# Patient Record
Sex: FEMALE | Race: WHITE | NOT HISPANIC OR LATINO | Employment: STUDENT | URBAN - METROPOLITAN AREA
[De-identification: names, ages, dates, MRNs, and addresses within clinical notes are randomized per-mention and may not be internally consistent; named-entity substitution may affect disease eponyms.]

---
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
## 2019-01-18 RX ORDER — ESCITALOPRAM OXALATE 10 MG/1
TABLET ORAL
Qty: 30 TABLET | Refills: 4 | Status: SHIPPED | OUTPATIENT
Start: 2019-01-18 | End: 2019-03-18 | Stop reason: ALTCHOICE

## 2019-03-07 ENCOUNTER — TELEPHONE (OUTPATIENT)
Dept: INTERNAL MEDICINE | Facility: CLINIC | Age: 19
End: 2019-03-07

## 2019-03-07 NOTE — TELEPHONE ENCOUNTER
----- Message from Sol Mancilla sent at 3/7/2019  9:56 AM EST -----  PATIENT'S FRIEND RUBI CASTELAN CALLED AND STATES PATIENT NEEDS A REFERRAL TO A THERAPIST OR PSYCHIATRIST FOR DEPRESSION AND ANXIETY. HE STATES PATIENT DOESN'T WANT TO GET OUT OF BED, GO TO CLASS OR TALK ON THE PHONE. HE STATES SHE IS HAVING PANIC ATTACKS BUT SHE'S NOT SUICIDAL. HE STATES SHE HAS TROUBLE TALKING ON THE PHONE AND CAN BE REACHED AFTER 2 PM. SHE CAN BE REACHED -[phone]

## 2019-03-07 NOTE — TELEPHONE ENCOUNTER
ATTEMPTED TO CALL X 2 WITH NO ANSWER AND MESSAGE THAT [employer] BOX IS FULL AND CANNOT ACCEPT NEW MESSAGES.

## 2019-03-08 NOTE — TELEPHONE ENCOUNTER
Tried calling [employer] Phone #   VM full  .  Unable to leave a message.  LMOVM for Niurka - mother to return call  Office # given   She is listed on the PAO consent form

## 2019-03-08 NOTE — TELEPHONE ENCOUNTER
Patient came in and added Benny Alva to [facility]. He would like you to give him a call regarding this manner @ [phone]. Thank you.

## 2019-03-08 NOTE — TELEPHONE ENCOUNTER
Patients significant other Benny Jbrenée called and stated that he would like to be called. Informed him that she would have to come in and sign PAO.

## 2019-03-12 NOTE — TELEPHONE ENCOUNTER
Called and left a message for Benny to call me back.  He called back almost immediately.  He states Rosita has had a worsening of her depression and anxiety.  She has having increased frequency of panic attacks.  She is having a hard time waking up and getting to class.  He is speaking for her because she has a hard time speaking over the phone.  He states she is not suicidal and is not harming herself in any way.  She does not feel like her Lexapro is working anymore.  Currently they are on spring break and will return on 3/15/19.    I recommended Rosita come in for an appointment and he is agreeable.  Please put her on the schedule for 3/18/19 at 1:15 PM for depression.

## 2019-03-18 ENCOUNTER — OFFICE VISIT (OUTPATIENT)
Dept: INTERNAL MEDICINE | Facility: CLINIC | Age: 19
End: 2019-03-18

## 2019-03-18 VITALS
WEIGHT: 160 LBS | BODY MASS INDEX: 24.33 KG/M2 | DIASTOLIC BLOOD PRESSURE: 70 MMHG | TEMPERATURE: 98.5 F | RESPIRATION RATE: 20 BRPM | HEART RATE: 72 BPM | SYSTOLIC BLOOD PRESSURE: 132 MMHG

## 2019-03-18 DIAGNOSIS — F41.1 GENERALIZED ANXIETY DISORDER: ICD-10-CM

## 2019-03-18 DIAGNOSIS — F32.89 OTHER DEPRESSION: Primary | ICD-10-CM

## 2019-03-18 PROCEDURE — 99213 OFFICE O/P EST LOW 20 MIN: CPT | Performed by: INTERNAL MEDICINE

## 2019-03-18 RX ORDER — BUPROPION HYDROCHLORIDE 150 MG/1
150 TABLET ORAL DAILY
Qty: 30 TABLET | Refills: 5 | Status: SHIPPED | OUTPATIENT
Start: 2019-03-18

## 2019-03-18 NOTE — PROGRESS NOTES
"Subjective       Rosita Watkins is a 18 y.o. female.     Chief Complaint   Patient presents with   • Depression   • Anxiety       History obtained from the patient and her boyfriend.      History of Present Illness     Depression and Anxiety follow-up: The patient is here for follow-up of Depression and Anxiety, which are worsened.    Interval events: The patient was last seen on 10/16/18, after Lexapro 10 mg had been prescribed.  She states the medication worked initially, but then stopped working.  Her depression and anxiety seemed to worsen with the onset of Winter.  It has been especially worse since about mid January.  She states she stopped her Lexapro 1 1/2 months ago because it did not seem to be helping.  The patient is having trouble getting up out of bed and has missed classes.  This has caused academic issues.  She has a lot of stress at school due to this being her \"weed out semester\" for pre-Physical Therapy.  No other known stressors or triggers.  The only other medication she has tried in the past was Celexa, but states she only took it for about 1 week  Symptoms: Worsened anxiety, but stable depression.  She states she is tired all the time,  despite sleeping 12 hours at night and napping occasionally.  She has had panic attacks approximately once per day.  She has some restlessness.  She is having problems with memory and concentration.  She denies insomnia, anhedonia,  feelings of hopelessness, feelings of worthlessness, feelings of guilt, and suicidal ideation.    Medication: None.    Current Outpatient Medications on File Prior to Visit   Medication Sig Dispense Refill   • clindamycin (CLEOCIN T) 1 % lotion 1 application Daily.     • TRI-SPRINTEC 0.18/0.215/0.25 MG-35 MCG per tablet        No current facility-administered medications on file prior to visit.        Current outpatient and discharge medications have been reconciled for the patient.  Reviewed by: Joann Campblel MD        The " following portions of the patient's history were reviewed and updated as appropriate: allergies, current medications, past family history, past medical history, past social history, past surgical history and problem list.    Review of Systems   Constitutional: Positive for fatigue and unexpected weight change (weight up 15 pounds).   Neurological:        Reports memory issues.   Psychiatric/Behavioral: Positive for decreased concentration. Negative for confusion, self-injury, sleep disturbance and suicidal ideas. The patient is nervous/anxious.          Objective       Blood pressure 132/70, pulse 72, temperature 98.5 °F (36.9 °C), temperature source Temporal, resp. rate 20, weight 72.6 kg (160 lb).      Physical Exam   Constitutional: She appears well-developed and well-nourished.   Neurological: She is alert.   Psychiatric: She has a normal mood and affect.   Nursing note and vitals reviewed.    Counseling was given to patient for the following topics: appropriate exercise, risks and benefits of treament options (medication v therapy), side effects of medications (Lexapro), stress increase in the patient's life (as per HPI), symptoms of anxiety,  and symptoms of depression . Total time of the encounter was 15 minutes and 15 minutes was spent counseling.      Assessment / Plan:  Rosita was seen today for depression and anxiety.    Diagnoses and all orders for this visit:    Other depression  -     buPROPion XL (WELLBUTRIN XL) 150 MG 24 hr tablet; Take 1 tablet by mouth Daily.  -     Ambulatory Referral to Psychology    Generalized anxiety disorder  -     buPROPion XL (WELLBUTRIN XL) 150 MG 24 hr tablet; Take 1 tablet by mouth Daily.  -     Ambulatory Referral to Psychology          Return in about 4 weeks (around 4/15/2019) for Recheck- Depression and Anxiety.

## 2019-04-22 ENCOUNTER — OFFICE VISIT (OUTPATIENT)
Dept: INTERNAL MEDICINE | Facility: CLINIC | Age: 19
End: 2019-04-22

## 2019-04-22 ENCOUNTER — TELEPHONE (OUTPATIENT)
Dept: INTERNAL MEDICINE | Facility: CLINIC | Age: 19
End: 2019-04-22

## 2019-04-22 VITALS
TEMPERATURE: 98.1 F | SYSTOLIC BLOOD PRESSURE: 120 MMHG | WEIGHT: 155 LBS | HEART RATE: 80 BPM | DIASTOLIC BLOOD PRESSURE: 70 MMHG | RESPIRATION RATE: 20 BRPM | BODY MASS INDEX: 23.57 KG/M2

## 2019-04-22 DIAGNOSIS — F41.1 GENERALIZED ANXIETY DISORDER: ICD-10-CM

## 2019-04-22 DIAGNOSIS — F32.89 OTHER DEPRESSION: Primary | ICD-10-CM

## 2019-04-22 PROCEDURE — 99213 OFFICE O/P EST LOW 20 MIN: CPT | Performed by: INTERNAL MEDICINE

## 2019-04-22 RX ORDER — BUSPIRONE HYDROCHLORIDE 5 MG/1
5 TABLET ORAL 2 TIMES DAILY
Qty: 60 TABLET | Refills: 5 | Status: SHIPPED | OUTPATIENT
Start: 2019-04-22 | End: 2019-07-22 | Stop reason: SDUPTHER

## 2019-04-22 NOTE — PROGRESS NOTES
Subjective       Rosita Watkins is a 18 y.o. female.     Chief Complaint   Patient presents with   • Depression     1 month follow up         History obtained from the patient.      History of Present Illness     Depression and Anxiety Follow-up: The patient is here for follow-up of Depression and Anxiety, which are  overall worsened.    Interval events: The patient was unable to tolerate Lexapro.  She was seen on 3/18/19, and Wellbutrin XL was started.  She feels like it helped her depression and anxiety more initially than now.  She is having counseling at [facility].  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  Symptoms: Slightly improved depression, but anxiety essentially unchanged.  She has stable fatigue. She denies panic attacks, insomnia, anhedonia,  feelings of hopelessness, feelings of worthlessness, feelings of guilt, memory loss, concentration problems,  and suicidal ideation.    Medication:  Wellbutrin XL.  Side effects: Headache.    Current Outpatient Medications on File Prior to Visit   Medication Sig Dispense Refill   • buPROPion XL (WELLBUTRIN XL) 150 MG 24 hr tablet Take 1 tablet by mouth Daily. 30 tablet 5   • clindamycin (CLEOCIN T) 1 % lotion 1 application Daily.     • TRI-SPRINTEC 0.18/0.215/0.25 MG-35 MCG per tablet        No current facility-administered medications on file prior to visit.        Current outpatient and discharge medications have been reconciled for the patient.  Reviewed by: Joann Campbell MD        The following portions of the patient's history were reviewed and updated as appropriate: allergies, current medications, past family history, past medical history, past social history, past surgical history and problem list.    Review of Systems   Constitutional: Positive for fatigue and unexpected weight change (decreased 5 pounds).   Neurological: Positive for headaches.   Psychiatric/Behavioral: Negative for confusion, decreased  concentration, sleep disturbance and suicidal ideas. The patient is nervous/anxious.          Objective       Blood pressure 120/70, pulse 80, temperature 98.1 °F (36.7 °C), temperature source Temporal, resp. rate 20, weight 70.3 kg (155 lb).      Physical Exam   Neurological: She is alert.   Psychiatric: She has a normal mood and affect.   Nursing note and vitals reviewed.    Counseling was given to patient for the following topics: risks and benefits of treament options, side effects of medications, stress increase in the patient's life, symptoms of anxiety and symptoms of depression . Total time of the encounter was 15 minutes and 15 minutes was spent counseling.      Assessment / Plan:  Rosita was seen today for depression.    Diagnoses and all orders for this visit:    Other depression    Generalized anxiety disorder  -     busPIRone (BUSPAR) 5 MG tablet; Take 1 tablet by mouth 2 (Two) Times a Day.     Continue Wellbutrin XL.    The patient was instructed to call in 1 -2  weeks with an update on her anxiety and depression.      Return in about 3 months (around 7/22/2019) for Recheck Depression / Anxiety.

## 2019-07-22 ENCOUNTER — OFFICE VISIT (OUTPATIENT)
Dept: INTERNAL MEDICINE | Facility: CLINIC | Age: 19
End: 2019-07-22

## 2019-07-22 VITALS
TEMPERATURE: 98 F | SYSTOLIC BLOOD PRESSURE: 120 MMHG | HEART RATE: 64 BPM | DIASTOLIC BLOOD PRESSURE: 70 MMHG | WEIGHT: 155 LBS | RESPIRATION RATE: 20 BRPM | BODY MASS INDEX: 23.57 KG/M2

## 2019-07-22 DIAGNOSIS — F41.1 GENERALIZED ANXIETY DISORDER: Primary | ICD-10-CM

## 2019-07-22 DIAGNOSIS — F32.89 OTHER DEPRESSION: ICD-10-CM

## 2019-07-22 PROCEDURE — 99213 OFFICE O/P EST LOW 20 MIN: CPT | Performed by: INTERNAL MEDICINE

## 2019-07-22 RX ORDER — BUSPIRONE HYDROCHLORIDE 10 MG/1
10 TABLET ORAL 2 TIMES DAILY
Qty: 60 TABLET | Refills: 5 | Status: SHIPPED | OUTPATIENT
Start: 2019-07-22

## 2019-07-22 NOTE — PROGRESS NOTES
Subjective       Rosita Watkins is a 18 y.o. female.     Chief Complaint   Patient presents with   • Anxiety       History obtained from the patient.      History of Present Illness     Depression and Anxiety Follow-up: The patient is here for follow-up of Depression and Anxiety, which are stable.    Interval events: The patient was unable to tolerate Lexapro.  She was seen on 3/18/19, and Wellbutrin XL was started.    She was seen on 4/22/2019 for follow-up.  The current dose of Wellbutrin XL was continued.  BuSpar 5 mg twice daily was added.  She was instructed to call 1 to 2 weeks after that appointment, but did not.  She feels the BuSpar only helped a little.  She is still in counseling.  Symptoms: Slightly improved anxiety, and stable depression.  She has panic attacks approximately 3 times per week.  These are usually situational.  She has stable fatigue. She denies  insomnia, anhedonia,  feelings of hopelessness, feelings of worthlessness, feelings of guilt, memory loss, concentration problems,  and suicidal ideation.    Medication:  Wellbutrin XL and BuSpar.  Side effects: Headache (improving).       Current Outpatient Medications on File Prior to Visit   Medication Sig Dispense Refill   • buPROPion XL (WELLBUTRIN XL) 150 MG 24 hr tablet Take 1 tablet by mouth Daily. 30 tablet 5   • clindamycin (CLEOCIN T) 1 % lotion 1 application Daily.     • TRI-SPRINTEC 0.18/0.215/0.25 MG-35 MCG per tablet      • [DISCONTINUED] busPIRone (BUSPAR) 5 MG tablet Take 1 tablet by mouth 2 (Two) Times a Day. 60 tablet 5     No current facility-administered medications on file prior to visit.        Current outpatient and discharge medications have been reconciled for the patient.  Reviewed by: Joann Campbell MD        The following portions of the patient's history were reviewed and updated as appropriate: allergies, current medications, past family history, past medical history, past social history, past surgical history and  problem list.    Review of Systems   Constitutional: Positive for fatigue.   Neurological: Positive for headaches.        Denies memory loss.   Psychiatric/Behavioral: Negative for decreased concentration, self-injury, sleep disturbance and suicidal ideas. The patient is nervous/anxious.          Objective       Blood pressure 120/70, pulse 64, temperature 98 °F (36.7 °C), temperature source Temporal, resp. rate 20, weight 70.3 kg (155 lb).      Physical Exam   Constitutional: She appears well-developed and well-nourished.   Neurological: She is alert.   Psychiatric: She has a normal mood and affect.   Nursing note and vitals reviewed.    Counseling was given to patient for the following topics: appropriate exercise, importance of medication compliance, side effects of medications, stress increase in the patient's life, symptoms of anxiety and symptoms of depression . Total time of the encounter was 15 minutes and 15 minutes was spent counseling.      Assessment / Plan:  Rosita was seen today for anxiety.    Diagnoses and all orders for this visit:    Generalized anxiety disorder  -     busPIRone (BUSPAR) 10 MG tablet; Take 1 tablet by mouth 2 (Two) Times a Day (increased dose).   Continue Wellbutrin XL.   Continue counseling.    Other depression   Continue Wellbutrin XL.   Continue counseling.          Return in about 3 months (around 10/22/2019) for Annual physical, fasting.

## 2019-10-02 ENCOUNTER — OFFICE VISIT (OUTPATIENT)
Dept: ORTHOPEDIC SURGERY | Facility: CLINIC | Age: 19
End: 2019-10-02

## 2019-10-02 VITALS — HEIGHT: 68 IN | HEART RATE: 74 BPM | OXYGEN SATURATION: 99 % | WEIGHT: 149.91 LBS | BODY MASS INDEX: 22.72 KG/M2

## 2019-10-02 DIAGNOSIS — M25.561 RIGHT KNEE PAIN, UNSPECIFIED CHRONICITY: Primary | ICD-10-CM

## 2019-10-02 DIAGNOSIS — M76.51 PATELLAR TENDINITIS OF RIGHT KNEE: ICD-10-CM

## 2019-10-02 PROCEDURE — 99203 OFFICE O/P NEW LOW 30 MIN: CPT | Performed by: ORTHOPAEDIC SURGERY

## 2019-10-02 NOTE — PROGRESS NOTES
[facility] [place] [facility] Note    Subjective     Chief Complaint   Patient presents with   • Right Knee - Pain        HPI  Rosita Watkins is a 19 y.o. female.  She complains of right knee pain.  Insidious onset.  Started 2 months ago.  She is doing.  Pain is 5 out of 10.  Is aching and throbbing.    Past Medical History:   Diagnosis Date   • Asthma, exercise induced 10/29/2017    Dx childhood.   • Bipolar affective disorder (CMS/HCC)    • Iron deficiency anemia 10/29/2017    Dx approx 2013.   • Mononucleosis 11/08/2017      Past Surgical History:   Procedure Laterality Date   • WISDOM TOOTH EXTRACTION  12/2015      Family History   Problem Relation Age of Onset   • Asthma Mother    • Heart disease Father         congenital valve disorder   • No Known Problems Brother    • Heart attack Maternal Grandfather 76   • Heart disease Paternal Grandmother         ? etiology   • Stroke Paternal Grandfather 86   • Heart disease Paternal Grandfather         ? etiology   • No Known Problems Brother    • Heart disease Paternal Uncle         x 5 (congenital valve disorder)     Social History     Socioeconomic History   • Marital status: Single     Spouse name: Not on file   • Number of children: Not on file   • Years of education: Not on file   • Highest education level: Not on file   Tobacco Use   • Smoking status: Never Smoker   • Smokeless tobacco: Never Used   Substance and Sexual Activity   • Alcohol use: No   • Drug use: No      Current Outpatient Medications on File Prior to Visit   Medication Sig Dispense Refill   • buPROPion XL (WELLBUTRIN XL) 150 MG 24 hr tablet Take 1 tablet by mouth Daily. 30 tablet 5   • busPIRone (BUSPAR) 10 MG tablet Take 1 tablet by mouth 2 (Two) Times a Day. 60 tablet 5   • Chlorcyclizine-Pseudoephed (STAHIST AD) 25-60 MG tablet Take 1 tablet by mouth Every 8 (Eight) Hours As Needed (congestion, runny nose). 42 tablet 0   • clindamycin (CLEOCIN T) 1 % lotion 1 application Daily.     •  "fluticasone (FLONASE) 50 MCG/ACT nasal spray 2 sprays into the nostril(s) as directed by provider Daily. 1 bottle 0   • lamoTRIgine (LaMICtal) 100 MG tablet Take 100 mg by mouth Daily.     • TRI-SPRINTEC 0.18/0.215/0.25 MG-35 MCG per tablet        No current facility-administered medications on file prior to visit.       Allergies   Allergen Reactions   • Amoxicillin Hives and Swelling     Throat closes    • Nickel Itching and Rash        The following portions of the patient's history were reviewed and updated as appropriate: allergies, current medications, past family history, past medical history, past social history, past surgical history and problem list.    Review of Systems   Constitutional: Positive for activity change and fatigue.   HENT: Negative.    Eyes: Negative.    Respiratory: Negative.    Cardiovascular: Negative.    Gastrointestinal: Negative.    Endocrine: Negative.    Genitourinary: Negative.    Musculoskeletal: Positive for arthralgias and joint swelling.   Skin: Negative.    Allergic/Immunologic: Negative.    Neurological: Negative.    Hematological: Negative.    Psychiatric/Behavioral: Negative.         Objective      Physical Exam  Pulse 74   Ht 172.7 cm (67.99\")   Wt 68 kg (149 lb 14.6 oz)   LMP 09/02/2019   SpO2 99%   BMI 22.80 kg/m²     Body mass index is 22.8 kg/m².    GENERAL APPEARANCE: awake, alert & oriented x 3, in no acute distress and well developed, well nourished  PSYCH: normal mood and affect  LUNGS:  breathing nonlabored, no wheezing  EYES: sclera anicteric, pupils equal  CARDIOVASCULAR: palpable pulses dorsalis pedis, palpable posterior tibial bilaterally. Capillary refill less than 2 seconds  INTEGUMENTARY: skin intact, no clubbing, cyanosis  NEUROLOGIC:  Normal Sensation and reflexes       Ortho Exam  Peripheral Vascular:    Upper Extremity:   Inspection:  Left--no cyanotic nail beds Right--no cyanotic nail beds   Bilateral:  Pink nail beds with brisk capillary " refill   Palpation:  Bilateral radial pulse normal  Musculoskeletal:  Global Assessment:  Overall assessment of Lower Extremity Muscle Strength and Tone:  Right quadriceps--5/5  Right hamstrings--5/5  Right tibialis anterior--5/5  Right gastroc soleus--5/5  Right EHL--5/5  Lower Extremity:  Knee/Patella:  No digital clubbing or cyanosis.    Examination of right knee reveals:  Normal deep tendon reflexes, coordination, strength, tone, sensation.  No known fractures or deformities.  Inspection and Palpation:    Right knee:  Tenderness: Patella tendon  Effusion:  none  Crepitus:  none  Pulses:  2+  Ecchymosis:  None  Warmth:  None   ROM:  Right:  Extension:0    Flexion:135  Left:  Extension:0     Flexion:135  Instability:  Right:  Lachman Test:  Negative, Varus stress test negative,   Valgus stress test negative, Posterior Drawer Test:  Negative  Deformities/Malalignments/Discrepancies:    Left:  none  Right:  none  Functional Testing:  Right:  Lisa's test:  Negative  Patella grind test:  Negative  Q-angle:  Normal  Apprehension Sign:  Negative        Imaging/Studies  Imaging Results (last 7 days)     Procedure Component Value Units Date/Time    XR Knee 3+ View With [place] Right [[id number]] Resulted:  10/02/19 1529     Updated:  10/02/19 1529    Narrative:       Knee X-Ray  Indication: Pain    Upright AP  Lateral, and Sunrise views of right knee     Findings:  No fracture  No bony lesion  Normal soft tissues  Normal joint spaces    No prior studies were available for comparison.            Assessment/Plan        ICD-10-CM ICD-9-CM   1. Right knee pain, unspecified chronicity M25.561 719.46   2. Patellar tendinitis of right knee M76.51 726.64       Orders Placed This Encounter   Procedures   • XR Knee 3+ View With [place] Right   • Ambulatory Referral to Physical Therapy      I have ordered physical therapy.  [place]  She will check with insurance.  She rides horses.  She will continue  anti-inflammatories.  She will follow-up in 3 weeks.    Medical Decision Making  Management Options : over-the-counter medicine and physical/occupational therapy  Data/Risk: radiology tests and independent visualization of imaging, lab tests, or EMG/NCV    Jesus Argueta MD  10/02/19  3:34 PM         EMR Dragon/Transcription disclaimer:  Much of this encounter note is an electronic transcription of spoken language to printed text. Electronic transcription of spoken language may permit erroneous, or at times, nonsensical words or phrases to be inadvertently transcribed. Although I have reviewed the note for such errors, some may still exist.

## 2019-10-21 DIAGNOSIS — M76.51 PATELLAR TENDINITIS OF RIGHT KNEE: Primary | ICD-10-CM

## 2020-12-02 ENCOUNTER — TELEPHONE (OUTPATIENT)
Dept: INTERNAL MEDICINE | Facility: CLINIC | Age: 20
End: 2020-12-02

## 2020-12-02 NOTE — TELEPHONE ENCOUNTER
Caller: Rosita Watkins    Relationship: Self    Best call back number: [phone]  What orders are you requesting (i.e. lab or imaging): REFERRAL FOR CHEST XRAY    In what timeframe would the patient need to come in:ASAP    Where will you receive your lab/imaging services: [facility]    Additional notes: COUGHING UP BLOOD    REQUESTING A CALL IF SHE CAN HAVE THIS REFERRAL

## 2020-12-08 NOTE — TELEPHONE ENCOUNTER
Dr Campbell     Left a 4th message for her to call back.    [facility] to return call.

## 2023-08-26 ENCOUNTER — HOSPITAL ENCOUNTER (EMERGENCY)
Facility: HOSPITAL | Age: 23
Discharge: HOME | End: 2023-08-26
Attending: EMERGENCY MEDICINE
Payer: COMMERCIAL

## 2023-08-26 ENCOUNTER — APPOINTMENT (OUTPATIENT)
Dept: CT IMAGING | Facility: HOSPITAL | Age: 23
End: 2023-08-26
Payer: COMMERCIAL

## 2023-08-26 VITALS
HEIGHT: 68 IN | RESPIRATION RATE: 16 BRPM | SYSTOLIC BLOOD PRESSURE: 108 MMHG | WEIGHT: 145 LBS | DIASTOLIC BLOOD PRESSURE: 88 MMHG | HEART RATE: 90 BPM | TEMPERATURE: 98.2 F | OXYGEN SATURATION: 100 % | BODY MASS INDEX: 21.98 KG/M2

## 2023-08-26 DIAGNOSIS — R19.7 ABDOMINAL PAIN, VOMITING, AND DIARRHEA: Primary | ICD-10-CM

## 2023-08-26 DIAGNOSIS — R10.9 ABDOMINAL PAIN, VOMITING, AND DIARRHEA: Primary | ICD-10-CM

## 2023-08-26 DIAGNOSIS — R11.10 ABDOMINAL PAIN, VOMITING, AND DIARRHEA: Primary | ICD-10-CM

## 2023-08-26 LAB
ALBUMIN SERPL-MCNC: 4.6 G/DL (ref 3.5–5.2)
ALBUMIN/GLOB SERPL: 1.4 G/DL
ALP SERPL-CCNC: 43 U/L (ref 39–117)
ALT SERPL W P-5'-P-CCNC: 13 U/L (ref 1–33)
ANION GAP SERPL CALCULATED.3IONS-SCNC: 15 MMOL/L (ref 5–15)
AST SERPL-CCNC: 32 U/L (ref 1–32)
B-HCG UR QL: NEGATIVE
BACTERIA UR QL AUTO: ABNORMAL /HPF
BASOPHILS # BLD AUTO: 0.03 10*3/MM3 (ref 0–0.2)
BASOPHILS NFR BLD AUTO: 0.2 % (ref 0–1.5)
BILIRUB SERPL-MCNC: 0.5 MG/DL (ref 0–1.2)
BILIRUB UR QL STRIP: NEGATIVE
BUN SERPL-MCNC: 12 MG/DL (ref 6–20)
BUN/CREAT SERPL: 14.1 (ref 7–25)
CALCIUM SPEC-SCNC: 9.3 MG/DL (ref 8.6–10.5)
CHLORIDE SERPL-SCNC: 104 MMOL/L (ref 98–107)
CLARITY UR: CLEAR
CO2 SERPL-SCNC: 19 MMOL/L (ref 22–29)
COLOR UR: YELLOW
CREAT SERPL-MCNC: 0.85 MG/DL (ref 0.57–1)
DEPRECATED RDW RBC AUTO: 44.7 FL (ref 37–54)
EGFRCR SERPLBLD CKD-EPI 2021: 98.3 ML/MIN/1.73
EOSINOPHIL # BLD AUTO: 0.01 10*3/MM3 (ref 0–0.4)
EOSINOPHIL NFR BLD AUTO: 0.1 % (ref 0.3–6.2)
ERYTHROCYTE [DISTWIDTH] IN BLOOD BY AUTOMATED COUNT: 14.9 % (ref 12.3–15.4)
EXPIRATION DATE: NORMAL
GLOBULIN UR ELPH-MCNC: 3.4 GM/DL
GLUCOSE SERPL-MCNC: 123 MG/DL (ref 65–99)
GLUCOSE UR STRIP-MCNC: NEGATIVE MG/DL
HCT VFR BLD AUTO: 34.6 % (ref 34–46.6)
HGB BLD-MCNC: 11.3 G/DL (ref 12–15.9)
HGB UR QL STRIP.AUTO: NEGATIVE
HYALINE CASTS UR QL AUTO: ABNORMAL /LPF
IMM GRANULOCYTES # BLD AUTO: 0.04 10*3/MM3 (ref 0–0.05)
IMM GRANULOCYTES NFR BLD AUTO: 0.3 % (ref 0–0.5)
INTERNAL NEGATIVE CONTROL: NEGATIVE
INTERNAL POSITIVE CONTROL: POSITIVE
KETONES UR QL STRIP: ABNORMAL
LEUKOCYTE ESTERASE UR QL STRIP.AUTO: ABNORMAL
LYMPHOCYTES # BLD AUTO: 0.46 10*3/MM3 (ref 0.7–3.1)
LYMPHOCYTES NFR BLD AUTO: 3.5 % (ref 19.6–45.3)
Lab: NORMAL
MCH RBC QN AUTO: 26.7 PG (ref 26.6–33)
MCHC RBC AUTO-ENTMCNC: 32.7 G/DL (ref 31.5–35.7)
MCV RBC AUTO: 81.8 FL (ref 79–97)
MONOCYTES # BLD AUTO: 0.59 10*3/MM3 (ref 0.1–0.9)
MONOCYTES NFR BLD AUTO: 4.5 % (ref 5–12)
NEUTROPHILS NFR BLD AUTO: 11.96 10*3/MM3 (ref 1.7–7)
NEUTROPHILS NFR BLD AUTO: 91.4 % (ref 42.7–76)
NITRITE UR QL STRIP: NEGATIVE
NRBC BLD AUTO-RTO: 0 /100 WBC (ref 0–0.2)
PH UR STRIP.AUTO: 5.5 [PH] (ref 5–8)
PLATELET # BLD AUTO: 274 10*3/MM3 (ref 140–450)
PMV BLD AUTO: 10.2 FL (ref 6–12)
POTASSIUM SERPL-SCNC: 3.7 MMOL/L (ref 3.5–5.2)
PROT SERPL-MCNC: 8 G/DL (ref 6–8.5)
PROT UR QL STRIP: NEGATIVE
RBC # BLD AUTO: 4.23 10*6/MM3 (ref 3.77–5.28)
RBC # UR STRIP: ABNORMAL /HPF
REF LAB TEST METHOD: ABNORMAL
SODIUM SERPL-SCNC: 138 MMOL/L (ref 136–145)
SP GR UR STRIP: 1.02 (ref 1–1.03)
SQUAMOUS #/AREA URNS HPF: ABNORMAL /HPF
UROBILINOGEN UR QL STRIP: ABNORMAL
WBC # UR STRIP: ABNORMAL /HPF
WBC NRBC COR # BLD: 13.09 10*3/MM3 (ref 3.4–10.8)

## 2023-08-26 PROCEDURE — 81025 URINE PREGNANCY TEST: CPT | Performed by: PHYSICIAN ASSISTANT

## 2023-08-26 PROCEDURE — 25010000002 DROPERIDOL PER 5 MG: Performed by: PHYSICIAN ASSISTANT

## 2023-08-26 PROCEDURE — 25510000001 IOPAMIDOL 61 % SOLUTION: Performed by: EMERGENCY MEDICINE

## 2023-08-26 PROCEDURE — 96361 HYDRATE IV INFUSION ADD-ON: CPT

## 2023-08-26 PROCEDURE — 25010000002 ONDANSETRON PER 1 MG: Performed by: PHYSICIAN ASSISTANT

## 2023-08-26 PROCEDURE — 80053 COMPREHEN METABOLIC PANEL: CPT | Performed by: PHYSICIAN ASSISTANT

## 2023-08-26 PROCEDURE — 74177 CT ABD & PELVIS W/CONTRAST: CPT

## 2023-08-26 PROCEDURE — 99285 EMERGENCY DEPT VISIT HI MDM: CPT

## 2023-08-26 PROCEDURE — 96374 THER/PROPH/DIAG INJ IV PUSH: CPT

## 2023-08-26 PROCEDURE — 87186 SC STD MICRODIL/AGAR DIL: CPT | Performed by: PHYSICIAN ASSISTANT

## 2023-08-26 PROCEDURE — 87077 CULTURE AEROBIC IDENTIFY: CPT | Performed by: PHYSICIAN ASSISTANT

## 2023-08-26 PROCEDURE — 25010000002 DIPHENHYDRAMINE PER 50 MG: Performed by: PHYSICIAN ASSISTANT

## 2023-08-26 PROCEDURE — 81001 URINALYSIS AUTO W/SCOPE: CPT | Performed by: PHYSICIAN ASSISTANT

## 2023-08-26 PROCEDURE — 96375 TX/PRO/DX INJ NEW DRUG ADDON: CPT

## 2023-08-26 PROCEDURE — 85025 COMPLETE CBC W/AUTO DIFF WBC: CPT | Performed by: PHYSICIAN ASSISTANT

## 2023-08-26 PROCEDURE — 87086 URINE CULTURE/COLONY COUNT: CPT | Performed by: PHYSICIAN ASSISTANT

## 2023-08-26 RX ORDER — ONDANSETRON 2 MG/ML
4 INJECTION INTRAMUSCULAR; INTRAVENOUS ONCE
Status: COMPLETED | OUTPATIENT
Start: 2023-08-26 | End: 2023-08-26

## 2023-08-26 RX ORDER — DROPERIDOL 2.5 MG/ML
1.25 INJECTION, SOLUTION INTRAMUSCULAR; INTRAVENOUS ONCE
Status: COMPLETED | OUTPATIENT
Start: 2023-08-26 | End: 2023-08-26

## 2023-08-26 RX ORDER — DIPHENHYDRAMINE HYDROCHLORIDE 50 MG/ML
25 INJECTION INTRAMUSCULAR; INTRAVENOUS ONCE
Status: COMPLETED | OUTPATIENT
Start: 2023-08-26 | End: 2023-08-26

## 2023-08-26 RX ORDER — ONDANSETRON 4 MG/1
4 TABLET, FILM COATED ORAL EVERY 6 HOURS PRN
Qty: 15 TABLET | Refills: 0 | Status: SHIPPED | OUTPATIENT
Start: 2023-08-26

## 2023-08-26 RX ADMIN — DROPERIDOL 1.25 MG: 2.5 INJECTION, SOLUTION INTRAMUSCULAR; INTRAVENOUS at 04:44

## 2023-08-26 RX ADMIN — SODIUM CHLORIDE 1000 ML: 9 INJECTION, SOLUTION INTRAVENOUS at 03:59

## 2023-08-26 RX ADMIN — DIPHENHYDRAMINE HYDROCHLORIDE 25 MG: 50 INJECTION INTRAMUSCULAR; INTRAVENOUS at 04:44

## 2023-08-26 RX ADMIN — IOPAMIDOL 100 ML: 612 INJECTION, SOLUTION INTRAVENOUS at 04:17

## 2023-08-26 RX ADMIN — ONDANSETRON 4 MG: 2 INJECTION INTRAMUSCULAR; INTRAVENOUS at 04:03

## 2023-08-26 NOTE — ED PROVIDER NOTES
Subjective  History of Present Illness:    Chief Complaint: Nausea vomiting diarrhea, abdominal pain  History of Present Illness: 23-year-old female presents with nausea vomiting diarrhea and abdominal pain that started earlier tonight.  She was concerned that her symptoms may have been triggered by her taking alcohol and BuSpar together, she has drank with BuSpar before but has never had symptoms like this, she just started the medication 3 weeks ago.  Onset: Sudden onset  Duration: Symptoms started approximately 4 to 5 hours ago  Exacerbating / Alleviating factors: Eating or drinking make worse, she is unable to keep down solids or liquids  Associated symptoms: Abdominal cramping      Nurses Notes reviewed and agree, including vitals, allergies, social history and prior medical history.     REVIEW OF SYSTEMS: All systems reviewed and not pertinent unless noted.    Review of Systems   Gastrointestinal:  Positive for abdominal pain, diarrhea and nausea.   All other systems reviewed and are negative.    Past Medical History:   Diagnosis Date    Asthma, exercise induced 10/29/2017    Dx childhood.    Bipolar affective disorder     Iron deficiency anemia 10/29/2017    Dx approx 2013.    Mononucleosis 11/08/2017       Allergies:    Penicillins, Nickel, and Amoxicillin      Past Surgical History:   Procedure Laterality Date    WISDOM TOOTH EXTRACTION  12/2015         Social History     Socioeconomic History    Marital status: Single   Tobacco Use    Smoking status: Never    Smokeless tobacco: Never   Vaping Use    Vaping Use: Never used   Substance and Sexual Activity    Alcohol use: No    Drug use: No         Family History   Problem Relation Age of Onset    Asthma Mother     Heart disease Father         congenital valve disorder    No Known Problems Brother     Heart attack Maternal Grandfather 76    Heart disease Paternal Grandmother         ? etiology    Stroke Paternal Grandfather 86    Heart disease Paternal  "Grandfather         ? etiology    No Known Problems Brother     Heart disease Paternal Uncle         x 5 (congenital valve disorder)       Objective  Physical Exam:  /64   Pulse 95   Temp 98.2 øF (36.8 øC) (Oral)   Resp 16   Ht 172.7 cm (68\")   Wt 65.8 kg (145 lb)   LMP 08/26/2023   SpO2 100%   BMI 22.05 kg/mý      Physical Exam  Vitals and nursing note reviewed.   Constitutional:       Appearance: She is well-developed.   HENT:      Head: Normocephalic and atraumatic.   Cardiovascular:      Rate and Rhythm: Normal rate and regular rhythm.   Pulmonary:      Effort: Pulmonary effort is normal.      Breath sounds: Normal breath sounds.   Abdominal:      General: There is no distension.      Tenderness: There is abdominal tenderness. There is no guarding.   Musculoskeletal:         General: Normal range of motion.      Cervical back: Normal range of motion and neck supple.   Skin:     General: Skin is warm and dry.   Neurological:      Mental Status: She is alert and oriented to person, place, and time.      Deep Tendon Reflexes: Reflexes are normal and symmetric.         Procedures    ED Course:         Lab Results (last 24 hours)       Procedure Component Value Units Date/Time    Urinalysis With Culture If Indicated - Urine, Clean Catch [[id number]]  (Abnormal) Collected: 08/26/23 0343    Specimen: Urine, Clean Catch Updated: 08/26/23 0411     Color, UA Yellow     Appearance, UA Clear     pH, UA 5.5     Specific Gravity, UA 1.024     Glucose, UA Negative     Ketones, UA 40 mg/dL (2+)     Bilirubin, UA Negative     Blood, UA Negative     Protein, UA Negative     Leuk Esterase, UA Trace     Nitrite, UA Negative     Urobilinogen, UA 0.2 E.U./dL    Narrative:      In absence of clinical symptoms, the presence of pyuria, bacteria, and/or nitrites on the urinalysis result does not correlate with infection.    Urinalysis, Microscopic Only - Urine, Clean Catch [[id number]]  (Abnormal) Collected: 08/26/23 0343    " Specimen: Urine, Clean Catch Updated: 08/26/23 0411     RBC, UA 0-2 /HPF      WBC, UA 6-12 /HPF      Bacteria, UA None Seen /HPF      Squamous Epithelial Cells, UA 3-6 /HPF      Hyaline Casts, UA 13-20 /LPF      Methodology Automated Microscopy    Urine Culture - Urine, Urine, Clean Catch [[id number]] Collected: 08/26/23 0343    Specimen: Urine, Clean Catch Updated: 08/26/23 0411    POCT, urine preg [[id number]]  (Normal) Collected: 08/26/23 0354    Specimen: Urine Updated: 08/26/23 0355     HCG, Urine, QL Negative     Lot Number [id number]     Internal Positive Control Positive     Internal Negative Control Negative     Expiration Date 01/03/2025    CBC Auto Differential [[id number]]  (Abnormal) Collected: 08/26/23 0358    Specimen: Blood Updated: 08/26/23 0413     WBC 13.09 10*3/mm3      RBC 4.23 10*6/mm3      Hemoglobin 11.3 g/dL      Hematocrit 34.6 %      MCV 81.8 fL      MCH 26.7 pg      MCHC 32.7 g/dL      RDW 14.9 %      RDW-SD 44.7 fl      MPV 10.2 fL      Platelets 274 10*3/mm3      Neutrophil % 91.4 %      Lymphocyte % 3.5 %      Monocyte % 4.5 %      Eosinophil % 0.1 %      Basophil % 0.2 %      Immature Grans % 0.3 %      Neutrophils, Absolute 11.96 10*3/mm3      Lymphocytes, Absolute 0.46 10*3/mm3      Monocytes, Absolute 0.59 10*3/mm3      Eosinophils, Absolute 0.01 10*3/mm3      Basophils, Absolute 0.03 10*3/mm3      Immature Grans, Absolute 0.04 10*3/mm3      nRBC 0.0 /100 WBC     Comprehensive Metabolic Panel [[id number]]  (Abnormal) Collected: 08/26/23 0358    Specimen: Blood Updated: 08/26/23 0442     Glucose 123 mg/dL      BUN 12 mg/dL      Creatinine 0.85 mg/dL      Sodium 138 mmol/L      Potassium 3.7 mmol/L      Chloride 104 mmol/L      CO2 19.0 mmol/L      Calcium 9.3 mg/dL      Total Protein 8.0 g/dL      Albumin 4.6 g/dL      ALT (SGPT) 13 U/L      AST (SGOT) 32 U/L      Alkaline Phosphatase 43 U/L      Total Bilirubin 0.5 mg/dL      Globulin 3.4 gm/dL      Comment: Calculated Result        A/G  Ratio 1.4 g/dL      BUN/Creatinine Ratio 14.1     Anion Gap 15.0 mmol/L      eGFR 98.3 mL/min/1.73     Narrative:      GFR Normal >60  Chronic Kidney Disease <60  Kidney Failure <15               CT Abdomen Pelvis With Contrast    Result Date: 8/26/2023  CT ABDOMEN PELVIS W CONTRAST Date of Exam: 8/26/2023 4:10 AM EDT Indication: nvd abd pain. Comparison: 11/6/2017. Technique: Axial CT images were obtained of the abdomen and pelvis following the uneventful intravenous administration of 100 mL Isovue-300. Reconstructed coronal and sagittal images were also obtained. Automated exposure control and iterative construction methods were used. Findings: The liver appears normal in size without focal abnormality. Spleen is normal size and appears homogeneous.  Stomach is nondistended.  No adrenal mass.  Kidneys appear unremarkable.  No hydronephrosis.  Urinary bladder is within normal limits.  There is no bowel distention.  No pneumatosis intestinalis, pneumoperitoneum or lymphadenopathy.  No significant ascites.  The appendix appears within normal limits.  Gallbladder is nondistended.  Biliary tree is nondistended.  The pancreas appears homogeneous.  Abdominal vasculature is within normal limits. The uterus and ovaries appear unremarkable. The lung bases are clear. The heart size is normal. Distal esophagus is unremarkable.Subcutaneous fat and underlying musculature appear within normal limits.  There are no acute osseous abnormalities or destructive bone lesions.     Impression: Impression: No acute abdominal or pelvic abnormality. Electronically Signed: Chance Corbin MD  8/26/2023 4:47 AM EDT  Workstation ID: [id number]        Medical Decision Making  Patient Presentation 23-year-old female with abdominal pain, nausea vomiting and diarrhea, on my initial assessment her vital signs are stable, she had mild pain on palpation but no guarding or rebound    DDX  Differential diagnosis could include early appendicitis, acute  appendicitis, gastric esophagitis, peptic ulcer, small bowel mass, small bowel obstruction, aortic dissection, mesenteric ischemia, gastroenteritis,          Data Review/Analysis/Ordering unique tests I reviewed and summarized previous medical records, including labs and any radiology reports, CBC, CMP, urinalysis and CT scan were ordered and performed.    Independent Review Studies I reviewed all labs and discussed with the patient at the bedside, including the CBC, CMP, and CT scan results.    Intervention/Re-evaluation the patient initially received antiemetics, she also received droperidol and Benadryl, as well as an IV bolus of fluid.    Independent Clinician no consultations performed    Risk Stratification tools/clinical decision rules patient presented with typical symptoms of a gastroenteritis, she had diffuse crampy abdominal pain with nausea vomiting diarrhea, she was in no acute distress, vital signs stable.  Her CT scan was unremarkable as well as her labs her symptoms did improve with fluids and IV medication    Shared Decision Making I discussed the results with the patient at the bedside, recommending clear liquids advance as tolerated, antiemetics.    Disposition she will be discharged in stable condition with the diagnosis abdominal pain vomiting and diarrhea most consistent with viral gastroenteritis    Problems Addressed:  Abdominal pain, vomiting, and diarrhea: complicated acute illness or injury    Amount and/or Complexity of Data Reviewed  Labs: ordered. Decision-making details documented in ED Course.  Radiology: ordered. Decision-making details documented in ED Course.    Risk  Prescription drug management.          Final diagnoses:   Abdominal pain, vomiting, and diarrhea          Christo Srinivasan Jr., PA-C  08/26/23 [id number]

## 2023-08-28 ENCOUNTER — TELEPHONE (OUTPATIENT)
Dept: EMERGENCY DEPT | Facility: HOSPITAL | Age: 23
End: 2023-08-28
Payer: COMMERCIAL

## 2023-08-28 LAB — BACTERIA SPEC AEROBE CULT: ABNORMAL

## 2023-08-31 ENCOUNTER — TELEPHONE (OUTPATIENT)
Dept: EMERGENCY DEPT | Facility: HOSPITAL | Age: 23
End: 2023-08-31
Payer: COMMERCIAL

## 2023-08-31 RX ORDER — SULFAMETHOXAZOLE AND TRIMETHOPRIM 800; 160 MG/1; MG/1
1 TABLET ORAL 2 TIMES DAILY
Qty: 10 TABLET | Refills: 0 | Status: SHIPPED | OUTPATIENT
Start: 2023-08-31 | End: 2023-09-05